# Patient Record
Sex: FEMALE | Race: WHITE | Employment: UNEMPLOYED | URBAN - METROPOLITAN AREA
[De-identification: names, ages, dates, MRNs, and addresses within clinical notes are randomized per-mention and may not be internally consistent; named-entity substitution may affect disease eponyms.]

---

## 2018-12-11 ENCOUNTER — THERAPY VISIT (OUTPATIENT)
Dept: PHYSICAL THERAPY | Facility: CLINIC | Age: 53
End: 2018-12-11
Payer: COMMERCIAL

## 2018-12-11 DIAGNOSIS — M53.3 COCCYDYNIA: Primary | ICD-10-CM

## 2018-12-11 PROCEDURE — 97110 THERAPEUTIC EXERCISES: CPT | Mod: GP | Performed by: PHYSICAL THERAPIST

## 2018-12-11 PROCEDURE — 97161 PT EVAL LOW COMPLEX 20 MIN: CPT | Mod: GP | Performed by: PHYSICAL THERAPIST

## 2018-12-11 NOTE — LETTER
[facility]  [address]  [facility] [id number] [phone]    2018    Re: Sagrario Gutierrez   :   1965  MRN:  [id number]   REFERRING PHYSICIAN:   Virgil Bolaños    [facility]    Date of Initial Evaluation:  2018  Visits:  Rxs Used: 1  Reason for Referral:  Coccydynia    EVALUATION SUMMARY    [facility] Initial Evaluation  Subjective:  The history is provided by the patient. No  was used.   Sagrario Gutierrez is a 53 year old female with a sacroiliac condition.  Condition occurred with:  Insidious onset.  Condition occurred: for unknown reasons.  This is a new condition  Pt saw MD for Coccyx pain that came on suddenly without any injury. Pt states the MD did imaging of her back and head due to Brain surgery in both  and  in which she still has some brain tumors. Pt reports nothing found on imaging, sent to PT for coccyx pain that started in October and has slowly been improving since then.  .    Patient reports pain:  Other.    Pain is described as aching  and reported as 4/10.   Pain is worse during the day.  Symptoms are exacerbated by sitting and relieved by activity/movement.  Since onset symptoms are gradually improving.  Special tests:  X-ray and MRI (MD ruled out any involvment of Brain as pt had recent imaging.).      General health as reported by patient is fair.  Past medical history: Brain tumor (2 brain tumor surgeries), headaches, thyroid problems, seizures (2)  Medical allergies: yes (penicillin).  Other surgeries include:  Cancer surgery and other.  Medication history: anti seizure medications, anti depressants.  Current occupation is Retired  .     Barriers include:  None as reported by the patient.  Red flags:  None as reported by the patient.                   Objective:    Lumbar/SI Evaluation  ROM:     Strength: + abdominal weakness as indicated with + LE lowering test  Lumbar Palpation:    Tenderness not present at Left:    Quadratus Lumborum; Erector Spinae or PSIS  Tenderness not present at Right:  Quadratus Lumborum; Erector Spinae or PSIS  SI joint/Sacrum:      Left negative at:    Sacral thrust  Right negative at:  Sacral thrust            Hip Evaluation  HIP AROM:    Flexion: Left: WNL    Right:  NWL  Internal Rotation: Left: WNL    Right: WNL  Hip Strength:    Abduction:  Left: 4+/5      Pain:strong/pain freeRight: 4-/5     Pain:strong/pain free  Adduction:  Left: /5   Pain:strong/pain free  Internal Rotation:  Left: 4+/5     Pain:strong/pain freeRight: 4-/5    Pain:strong/pain free  External Rotation:  Left: 4+/5    Pain: strong/pain free  Right: 4-/5    Pain: strong/pain free  Hip Special Testing:    Left hip negative for the following special tests:  Piriformis; Idania; Fadir/Labrum or SLR  Right hip negative for the following special tests:  Piriformis; Idania; Fadir/Labrum or SLR    Hip Palpation:  Normal (Normal Palpation of B greater trochanters, piriformis, ischial tuberosity, iliac crest, IT band)     [place] Lumbar Evaluation  Posture:  Sitting: good  Standing: good  Lordosis: WNL  Lateral Shift: no  Movement Loss:  Flexion (Flex): nil  Extension (EXT): min  Test Movements:  FIS: During: no effect  After: no effect    Repeat FIS: During: no effect  After: no effect    EIS: During: no effect  After: no effect    Repeat EIS: During: no effect  After: no effect    ELIESER: During: no effect  After: no effect    Repeat ELIESER: During: no effect  After: no effect    EIL: During: no effect  After: no effect    Repeat EIL: During: no effect  After: no effect    Conclusion: other                                 No pain replication during low back or Hip exam this session.    Assessment/Plan:    Patient is a 53 year old female with sacral complaints.    Patient has the following significant findings with  corresponding treatment plan.                Diagnosis 1:  Coccydynia  Pain -  hot/cold therapy, US, electric stimulation, manual therapy, STS, splint/taping/bracing/orthotics, self management, education, directional preference exercise and home program  Decreased joint mobility - manual therapy, therapeutic exercise, therapeutic activity and home program  Decreased strength - therapeutic exercise, therapeutic activities and home program    Therapy Evaluation Codes:   1) History comprised of:   Personal factors that impact the plan of care:      None.    Comorbidity factors that impact the plan of care are:      seizures, thyroid problems, headaches, 2 brain surgeries and brain tumors, benign.     Medications impacting care: anti seizure, anti depressants, thyroid.  2) Examination of Body Systems comprised of:   Body structures and functions that impact the plan of care:      coccyx.   Activity limitations that impact the plan of care are:      sitting.  3) Clinical presentation characteristics are:   Stable/Uncomplicated.  4) Decision-Making    Low complexity using standardized patient assessment instrument and/or measureable assessment of functional outcome.  Cumulative Therapy Evaluation is: Low complexity    Previous and current functional limitations:  (See Goal Flow Sheet for this information)    Short term and Long term goals: (See Goal Flow Sheet for this information)     Communication ability:  Patient appears to be able to clearly communicate and understand verbal and written communication and follow directions correctly.  Treatment Explanation - The following has been discussed with the patient:   RX ordered/plan of care  Anticipated outcomes  Possible risks and side effects  This patient would benefit from PT intervention to resume normal activities.   Rehab potential is good.    Frequency:  1 X week, once daily  Duration:  for 6 weeks  Discharge Plan:  Achieve all LTG.  Independent in home treatment  program.  Reach maximal therapeutic benefit.    Thank you for your referral.    INQUIRIES  Therapist: Gissel Marie DPT  [facility] PHYSICAL THERAPY  [id number] [place] [address]. [address]  [facility] [address]  Phone: [phone]  Fax: [phone]

## 2018-12-11 NOTE — PROGRESS NOTES
[facility] Initial Evaluation  Subjective:  The history is provided by the patient. No  was used.   Sagrario Gutierrez is a 53 year old female with a sacroiliac condition.  Condition occurred with:  Insidious onset.  Condition occurred: for unknown reasons.  This is a new condition  Pt saw MD for Coccyx pain that came on suddenly without any injury. Pt states the MD did imaging of her back and head due to Brain surgery in both 2013 and 2017 in which she still has some brain tumors. Pt reports nothing found on imaging, sent to PT for coccyx pain that started in October and has slowly been improving since then.  .    Patient reports pain:  Other.    Pain is described as aching  and reported as 4/10.   Pain is worse during the day.  Symptoms are exacerbated by sitting and relieved by activity/movement.  Since onset symptoms are gradually improving.  Special tests:  X-ray and MRI (MD ruled out any involvment of Brain as pt had recent imaging.).      General health as reported by patient is fair.  Past medical history: Brain tumor (2 brain tumor surgeries), headaches, thyroid problems, seizures (2)  Medical allergies: yes (penicillin).  Other surgeries include:  Cancer surgery and other.  Medication history: anti seizure medications, anti depressants.  Current occupation is Retired  .        Barriers include:  None as reported by the patient.    Red flags:  None as reported by the patient.                        Objective:  System         Lumbar/SI Evaluation  ROM:      Strength: + abdominal weakness as indicated with + LE lowering test            Lumbar Palpation:      Tenderness not present at Left:    Quadratus Lumborum; Erector Spinae or PSIS    Tenderness not present at Right:  Quadratus Lumborum; Erector Spinae or PSIS        SI joint/Sacrum:          Left negative at:    Sacral thrust    Right negative at:  Sacral thrust                                      Hip  Evaluation  HIP AROM:    Flexion: Left: WNL    Right:  NWL          Internal Rotation: Left: WNL    Right: WNL          Hip Strength:        Abduction:  Left: 4+/5      Pain:strong/pain freeRight: 4-/5     Pain:strong/pain free  Adduction:  Left: /5   Pain:strong/pain free  Internal Rotation:  Left: 4+/5     Pain:strong/pain freeRight: 4-/5    Pain:strong/pain free  External Rotation:  Left: 4+/5    Pain: strong/pain free  Right: 4-/5    Pain: strong/pain free            Hip Special Testing:      Left hip negative for the following special tests:  Piriformis; Idania; Fadir/Labrum or SLR  Right hip negative for the following special tests:  Piriformis; Idania; Fadir/Labrum or SLR    Hip Palpation:  Normal (Normal Palpation of B greater trochanters, piriformis, ischial tuberosity, iliac crest, IT band)                      [place] Lumbar Evaluation    Posture:  Sitting: good  Standing: good  Lordosis: WNL  Lateral Shift: no      Movement Loss:  Flexion (Flex): nil  Extension (EXT): min      Test Movements:  FIS: During: no effect  After: no effect    Repeat FIS: During: no effect  After: no effect    EIS: During: no effect  After: no effect    Repeat EIS: During: no effect  After: no effect    ELIESER: During: no effect  After: no effect    Repeat ELIESER: During: no effect  After: no effect    EIL: During: no effect  After: no effect    Repeat EIL: During: no effect  After: no effect          Conclusion: other                                     No pain replication during low back or Hip exam this session.    ROS    Assessment/Plan:    Patient is a 53 year old female with sacral complaints.    Patient has the following significant findings with corresponding treatment plan.                Diagnosis 1:  Coccydynia  Pain -  hot/cold therapy, US, electric stimulation, manual therapy, STS, splint/taping/bracing/orthotics, self management, education, directional preference exercise and home program  Decreased joint mobility -  manual therapy, therapeutic exercise, therapeutic activity and home program  Decreased strength - therapeutic exercise, therapeutic activities and home program    Therapy Evaluation Codes:   1) History comprised of:   Personal factors that impact the plan of care:      None.    Comorbidity factors that impact the plan of care are:      seizures, thyroid problems, headaches, 2 brain surgeries and brain tumors, benign.     Medications impacting care: anti seizure, anti depressants, thyroid.  2) Examination of Body Systems comprised of:   Body structures and functions that impact the plan of care:      coccyx.   Activity limitations that impact the plan of care are:      sitting.  3) Clinical presentation characteristics are:   Stable/Uncomplicated.  4) Decision-Making    Low complexity using standardized patient assessment instrument and/or measureable assessment of functional outcome.  Cumulative Therapy Evaluation is: Low complexity    Previous and current functional limitations:  (See Goal Flow Sheet for this information)    Short term and Long term goals: (See Goal Flow Sheet for this information)     Communication ability:  Patient appears to be able to clearly communicate and understand verbal and written communication and follow directions correctly.  Treatment Explanation - The following has been discussed with the patient:   RX ordered/plan of care  Anticipated outcomes  Possible risks and side effects  This patient would benefit from PT intervention to resume normal activities.   Rehab potential is good.    Frequency:  1 X week, once daily  Duration:  for 6 weeks  Discharge Plan:  Achieve all LTG.  Independent in home treatment program.  Reach maximal therapeutic benefit.    Please refer to the daily flowsheet for treatment today, total treatment time and time spent performing 1:1 timed codes.

## 2018-12-17 ENCOUNTER — THERAPY VISIT (OUTPATIENT)
Dept: PHYSICAL THERAPY | Facility: CLINIC | Age: 53
End: 2018-12-17
Payer: COMMERCIAL

## 2018-12-17 DIAGNOSIS — M53.3 PAIN IN THE COCCYX: ICD-10-CM

## 2018-12-17 PROCEDURE — 97112 NEUROMUSCULAR REEDUCATION: CPT | Mod: GP | Performed by: PHYSICAL THERAPIST

## 2018-12-17 PROCEDURE — 97035 APP MDLTY 1+ULTRASOUND EA 15: CPT | Mod: GP | Performed by: PHYSICAL THERAPIST

## 2018-12-17 PROCEDURE — 97110 THERAPEUTIC EXERCISES: CPT | Mod: GP | Performed by: PHYSICAL THERAPIST

## 2018-12-17 NOTE — PROGRESS NOTES
Subjective:  HPI                    Objective:  System    Physical Exam    General     ROS    Assessment/Plan:    SUBJECTIVE  Subjective changes as noted by pt:  No change in the tailbone pain, pain varies day to day. She does c/o R hip pain related to a fall 4 years ago- most recent flare up was in 11/2018. Min to no pain today.      Current pain level: 3/10     Changes in function:  None     Adverse reaction to treatment or activity:  None    OBJECTIVE  Changes in objective findings:  LROM: FLX hands to ankles- pain free  EXT min limitation- mild tension in coccyx  PT with palpation to coccyx R>L in prone  Press ups: no affect during or after mvmt, increase ROM  Instruct in kegel 2-3x daily to mobilize coccyx in FLX/EXT    R  Piriformis/ adductors tight- added stretches      ASSESSMENT  Sagrario continues to require intervention to meet STG and LTG's: PT  Patient is becoming more independent in home exercise program    Progress made towards STG/LTG?  None    PLAN  Current treatment program is being advanced to more complex exercises.  The following procedures have been added:  stretching, neuromuscular re-education, posture and ultrasound    PTA/ATC plan:  N/A    Please refer to the daily flowsheet for treatment today, total treatment time and time spent performing 1:1 timed codes.

## 2019-01-02 ENCOUNTER — THERAPY VISIT (OUTPATIENT)
Dept: PHYSICAL THERAPY | Facility: CLINIC | Age: 54
End: 2019-01-02
Payer: COMMERCIAL

## 2019-01-02 DIAGNOSIS — M53.3 PAIN IN THE COCCYX: ICD-10-CM

## 2019-01-02 PROCEDURE — 97140 MANUAL THERAPY 1/> REGIONS: CPT | Mod: GP | Performed by: PHYSICAL THERAPIST

## 2019-01-02 PROCEDURE — 97110 THERAPEUTIC EXERCISES: CPT | Mod: GP | Performed by: PHYSICAL THERAPIST

## 2019-01-02 PROCEDURE — 97035 APP MDLTY 1+ULTRASOUND EA 15: CPT | Mod: GP | Performed by: PHYSICAL THERAPIST

## 2019-01-02 NOTE — PROGRESS NOTES
Subjective:  HPI                    Objective:  System    Physical Exam    General     ROS    Assessment/Plan:    SUBJECTIVE  Subjective changes as noted by pt:  Less intensity of tailbone pain with sitting now. HEP is going ok.    Current pain level: 2/10     Changes in function:  Yes (See Goal flowsheet attached for changes in current functional level)     Adverse reaction to treatment or activity:  None    OBJECTIVE  Changes in objective findings:  Yes, less PT at coccyx, mm tension/PT at OI and ST surrounding coccyx. Added MT- porsha. Well.  Instruct in roll outs with TB in sitting- porsha well.  Press up: no affect during or after mvmt, increased ROM         ASSESSMENT  Sagrario continues to require intervention to meet STG and LTG's: PT  Patient is progressing as expected.  Patient is becoming more independent in home exercise program  Response to therapy has shown an improvement in  pain level and function  Progress made towards STG/LTG?  Yes (See Goal flowsheet attached for updates on achievement of STG and LTG)    PLAN  Current treatment program is being advanced to more complex exercises.  The following procedures have been added:  strengthening and manual therapy    PTA/ATC plan:  N/A    Please refer to the daily flowsheet for treatment today, total treatment time and time spent performing 1:1 timed codes.

## 2019-02-04 ENCOUNTER — THERAPY VISIT (OUTPATIENT)
Dept: PHYSICAL THERAPY | Facility: CLINIC | Age: 54
End: 2019-02-04
Payer: COMMERCIAL

## 2019-02-04 DIAGNOSIS — M53.3 PAIN IN THE COCCYX: ICD-10-CM

## 2019-02-04 PROCEDURE — 97035 APP MDLTY 1+ULTRASOUND EA 15: CPT | Mod: GP | Performed by: PHYSICAL THERAPIST

## 2019-02-04 PROCEDURE — 97140 MANUAL THERAPY 1/> REGIONS: CPT | Mod: GP | Performed by: PHYSICAL THERAPIST

## 2019-02-04 PROCEDURE — 97110 THERAPEUTIC EXERCISES: CPT | Mod: GP | Performed by: PHYSICAL THERAPIST

## 2019-02-04 NOTE — PROGRESS NOTES
Subjective:  HPI                    Objective:  System    Physical Exam    General     ROS    Assessment/Plan:    SUBJECTIVE  Subjective changes as noted by pt: My tailbone pain is better overall- I can sit for over 30' now with less pain. HEP is going good- I was on a 2 wk vacation so not as consistent.     Current pain level: 1/10     Changes in function:  Yes (See Goal flowsheet attached for changes in current functional level)     Adverse reaction to treatment or activity:  None    OBJECTIVE  Changes in objective findings:  Yes,Less PT at coccyx and surrounding tissue and OI B.  Instruct in glut strengthening- porsha well pain free         ASSESSMENT  Sagrario continues to require intervention to meet STG and LTG's: PT  Patient's symptoms are resolving.  Patient is progressing as expected.  Patient is becoming more independent in home exercise program  Response to therapy has shown an improvement in  pain level, posture and function  Progress made towards STG/LTG?  Yes (See Goal flowsheet attached for updates on achievement of STG and LTG)    PLAN  Current treatment program is being advanced to more complex exercises.  The following procedures have been added:  strengthening    PTA/ATC plan:  N/A    Please refer to the daily flowsheet for treatment today, total treatment time and time spent performing 1:1 timed codes.

## 2019-02-15 ENCOUNTER — THERAPY VISIT (OUTPATIENT)
Dept: PHYSICAL THERAPY | Facility: CLINIC | Age: 54
End: 2019-02-15
Payer: COMMERCIAL

## 2019-02-15 DIAGNOSIS — M53.3 PAIN IN THE COCCYX: ICD-10-CM

## 2019-02-15 PROCEDURE — 97140 MANUAL THERAPY 1/> REGIONS: CPT | Mod: GP | Performed by: PHYSICAL THERAPIST

## 2019-02-15 PROCEDURE — 97110 THERAPEUTIC EXERCISES: CPT | Mod: GP | Performed by: PHYSICAL THERAPIST

## 2019-02-15 PROCEDURE — 97035 APP MDLTY 1+ULTRASOUND EA 15: CPT | Mod: GP | Performed by: PHYSICAL THERAPIST

## 2019-03-08 ENCOUNTER — THERAPY VISIT (OUTPATIENT)
Dept: PHYSICAL THERAPY | Facility: CLINIC | Age: 54
End: 2019-03-08
Payer: COMMERCIAL

## 2019-03-08 DIAGNOSIS — M53.3 PAIN IN THE COCCYX: ICD-10-CM

## 2019-03-08 PROCEDURE — 97140 MANUAL THERAPY 1/> REGIONS: CPT | Mod: GP | Performed by: PHYSICAL THERAPIST

## 2019-03-08 PROCEDURE — 97035 APP MDLTY 1+ULTRASOUND EA 15: CPT | Mod: GP | Performed by: PHYSICAL THERAPIST

## 2019-03-08 PROCEDURE — 97110 THERAPEUTIC EXERCISES: CPT | Mod: GP | Performed by: PHYSICAL THERAPIST

## 2019-03-08 NOTE — PROGRESS NOTES
Subjective:  HPI                    Objective:  System    Physical Exam    General     ROS    Assessment/Plan:    SUBJECTIVE  Subjective changes as noted by pt:  Some days no pain at all then occasionally the tailbone hurts when sitting for longer periods. Overall so much better. HEP is going well.      Current pain level: 0/10     Changes in function:  Yes (See Goal flowsheet attached for changes in current functional level)     Adverse reaction to treatment or activity:  None    OBJECTIVE  Changes in objective findings:  No changes since HI dated 2/15/19.  Reviewed HEP.          ASSESSMENT  Sagrario continues to require intervention to meet STG and LTG's: PT  Patient's symptoms are resolving.  Patient is progressing as expected.  Patient is becoming more independent in home exercise program  Response to therapy has shown an improvement in  pain level, muscle control, posture and function  Progress made towards STG/LTG?  Yes (See Goal flowsheet attached for updates on achievement of STG and LTG)    PLAN  Continue current treatment plan until patient demonstrates readiness to progress to higher level exercises.  PT will continue HEP and Follow-up by phone with any questions or concerns in next 2 months.  PTA/ATC plan:  N/A  DISCHARGE REPORT    Progress reporting period is from 12/11/18 to 3/8/19.     SUBJECTIVE          Initial Pain level: 2/10        ;   ,     Patient has failed to return to therapy so current objective findings are unknown.  The subjective and objective information are from the last SOAP note on this patient.    OBJECTIVE         ASSESSMENT/PLAN  Updated problem list and treatment plan: Diagnosis 1:  Coccyx pain    STG/LTGs have been met or progress has been made towards goals:    Assessment of Progress: The patient has not returned to therapy. Current status is unknown.  Self Management Plans:  Patient has been instructed in a home treatment program.  Patient  has been instructed in self management  of symptoms.    Sagrario continues to require the following intervention to meet STG and LTG's: PT  The patient failed to complete scheduled/ordered appointments so current information is unknown.  We will discharge this patient from PT.    Recommendations:  DC    Please refer to the daily flowsheet for treatment today, total treatment time and time spent performing 1:1 timed codes.    Please refer to the daily flowsheet for treatment today, total treatment time and time spent performing 1:1 timed codes.

## 2019-06-11 PROBLEM — M53.3 PAIN IN THE COCCYX: Status: RESOLVED | Noted: 2018-12-17 | Resolved: 2019-06-11
